# Patient Record
Sex: FEMALE
[De-identification: names, ages, dates, MRNs, and addresses within clinical notes are randomized per-mention and may not be internally consistent; named-entity substitution may affect disease eponyms.]

---

## 2019-08-15 ENCOUNTER — HOSPITAL ENCOUNTER (EMERGENCY)
Dept: HOSPITAL 88 - ER | Age: 29
Discharge: HOME | End: 2019-08-15
Payer: SELF-PAY

## 2019-08-15 VITALS — HEIGHT: 64 IN | WEIGHT: 197 LBS | BODY MASS INDEX: 33.63 KG/M2

## 2019-08-15 DIAGNOSIS — J20.9: ICD-10-CM

## 2019-08-15 DIAGNOSIS — R50.9: Primary | ICD-10-CM

## 2019-08-15 DIAGNOSIS — R05: ICD-10-CM

## 2019-08-15 PROCEDURE — 99283 EMERGENCY DEPT VISIT LOW MDM: CPT

## 2019-08-15 PROCEDURE — 71046 X-RAY EXAM CHEST 2 VIEWS: CPT

## 2019-08-15 NOTE — DIAGNOSTIC IMAGING REPORT
Frontal and lateral views of the chest.



HISTORY:  Fever, cough

COMPARISON:  None available.

     

DISCUSSION:

   

Lungs:  

The lungs are well inflated.   

No evidence of a consolidative pneumonia or pulmonary alveolar edema.



Pleura:  

No pleural effusion or pneumothorax.



Heart and mediastinum:  

The cardiomediastinal silhouette appear(s) unremarkable.



Bones and soft tissues:  

Appear unremarkable.





IMPRESSION: 

No acute radiographic abnormality.



Signed by: Dr. Koffi Mayen D.O., M.M.M. on 8/15/2019 6:47 AM

## 2019-08-15 NOTE — XMS REPORT
Patient Summary Document

                             Created on: 08/15/2019



JENNA ARELLANO

External Reference #: [id number]

: 1990

Sex: Female



Demographics







                          Address                   [address] [place]  [address]

 

                          Home Phone                [phone]

 

                          Preferred Language        Unknown

 

                          Marital Status            Unknown

 

                          Protestant Affiliation     Unknown

 

                          Race                      Unknown

 

                                        Additional Race(s)  

 

                          Ethnic Group              Unknown





Author







                          Author                    [facility]

 

                          Address                   Unknown

 

                          Phone                     Unavailable







Support







                Name            Relationship    Address         Phone

 

                    RICHARD SHORE        PRS                 [address] [place], [place]  [id number] [phone]

 

                    EILEEN  CHER     PRS                 [address] [place]  [address]                     [phone]







Care Team Providers







                    Care Team Member Name    Role                Phone

 

                          Unavailable               Unavailable







Payers







             Payer Name    Policy Type    Policy Number    Effective Date    Expiration Date







Problems

This patient has no known problems.



Allergies, Adverse Reactions, Alerts







          Allergy Name    Allergy Type    Status    Severity    Reaction(s)    Onset Date    Inactive 

Date                      Treating Clinician        Comments

 

        No Known Allergies    DA      Active    U               2019 00:00:00                     

 

        No Known Allergies    DA      Active    U               2019 00:00:00                     

 

        No Known Allergies    DA      Active    U               2019 00:00:00                     

 

        No Known Allergies    DA      Active    U               2018 00:00:00                     

 

        No Known Allergies    DA      Active    U               2012 00:00:00                     







Medications

This patient has no known medications.



Results







           Test Description    Test Time    Test Comments    Text Results    Atomic Results    Result

 Comments

 

                SURGICAL SPECIMENS    2019 07:43:00                    --------------------------------------------------------------------------------------------RUN

 DATE: 19                     [facility]  *LIVE*                     
PAGE 1   RUN TIME: 743                            Specimen Inquiry             
      RUN USER: INTERFACE                                                       
   ----------------------------------------------------------------
----------------------------PATIENT: JENNA ARELLANO              ACCT #: 
[id number] LOC:  [facility]      U #: [id number]                                   
   AGE/SX: 28/F         ROOM: [facility]      RE19REG DR:  Lucía Andres MD            :    [birth year]     BED:  1          DIS:                        
                       STATUS: ADM IN       TLOC:           
----------------------------
---------------------------------------------------------------- SPEC #: 
[id number]:CL:        RECD:      STATUS:  THELMA           REQ #: 
[id number]                           VERONICA:      [facility] DR: Lucía Andres MD             ENTERED:      SP TYPE: SURG SPEC      OTHR DR: 
Nick Meredith JR, MD          ORDERED:  GM LEVEL 4                              
                                          CODES: [id number] - FALLOPIAN TUBE COPIES 
TO:   Lucía Andres MD   [address] [place] [address]   
[phone]    Nick Meredith JR, MD   [address] [place] [address]   [phone] PROCEDURES: GM LEVEL 4 (Incomplete) TISSUES:           1. 
FALLOPIAN TUBE, NOS - Fallopian tube, right, segment         2. FALLOPIAN TUBE, 
NOS - Fallopian tube, left, segment         FINAL DIAGNOSIS    Fallopian tube, 
right, segment: Complete transection.       Fallopian tube, left, segment: 
Complete transection; small benign paratubal    cysts.      GROSS AND 
MICROSCOPIC    GROSS DESCRIPTION: Received in formalin and labeled "right 
fallopian tube"    is a 9.9 x 0.9 cm tan, cylindrical segment. Representative 
cross-sections     are submitted in (A).                                        
                                                                                
             Received in formalin and labeled "left fallopian tube" is a 7.1 x 
0.9 cm      tan cylindrical segment. Representative cross-sections are submitted
in       (B).                                                                   
                                                                                
   MICROSCOPIC EXAMINATION:  Each fallopian tube segment has a completely       
transected lumen and is free of significant inflammation. The second          
specimen also shows small benign paratubal cysts.                               
                            ** CONTINUED ON NEXT PAGE ** 
------------------------------------------------------------------------------------------
--RUN DATE: 19                     [facility]  *LIVE*                
    PAGE 2   RUN TIME: 743                            Specimen Inquiry         
          RUN USER: INTERFACE                                                   
       
--------------------------------------------------------------------------------------------SPEC
#: 19:CL:       PATIENT: JENNA ARELLANO               #[id number]  
(Continued)--------------------------------------------------------------------------------------------
        POST-OP DIAGNOSIS    Desires sterilization, 39.3 week intrauterine preg
tracy, previous          PRE-OP DIAGNOSIS    Desires sterilization, 
39.3 week intrauterine pregnancy, previous 
-------------------------------------------------------------------------
------------------- Signed SIGNATURE ON FILE                        Adilson Walker MD 19 0743      
--------------------------------------------------------------------------------------------
                                                                        ** END 
OF REPORT **                             

 

                CBC W/AUTO DIFF    2019 07:28:00                      

 

   

 

                WHITE BLOOD CELL (test code=WBC)    10.44 x10 3/uL    4.5-11.0         

 

                RED BLOOD CELL (test code=RBC)    3.25 x10 6/uL    3.54-5.02        

 

                HEMOGLOBIN (test code=HGB)    8.7 g/dL        11.0-15.0        

 

                HEMATOCRIT (test code=HCT)    28.2 %          33.0-45.0        

 

                MEAN CELL VOLUME (test code=MCV)    86.8 fL         81.0-99.0        

 

                MEAN CELL HGB (test code=MCH)    26.8 pg         27.0-33.0        

 

                MEAN CELL HGB CONCETRATION (test code=MCHC)    30.9 g/dL       33.0-37.0        

 

                RED CELL DISTRIBUTION WIDTH CV (test code=RDW)    13.2 %          11.5-14.5        

 

                RED CELL DISTRIBUTION WIDTH SD (test code=RDW-SD)    41.5 fL         37.0-54.0        

 

                PLATELET COUNT (test code=PLT)    195 x10 3/uL    150-400          

 

                MEAN PLATELET VOLUME (test code=MPV)    11.2 fL         7.0-9.0          

 

                NEUTROPHIL % (test code=NT%)    74.0 %          56.0-77.0        

 

                IMMATURE GRANULOCYTE % (test code=IG%)    0.5 %           0.0-2.0          

 

                LYMPHOCYTE % (test code=LY%)    18.2 %          14.0-32.0        

 

                MONOCYTE % (test code=MO%)    6.6 %           4.8-9.0          

 

                EOSINOPHIL % (test code=EO%)    0.5 %           0.3-3.7          

 

                BASOPHIL % (test code=BA%)    0.2 %           0.0-2.0          

 

                NUCLEATED RBC % (test code=NRBC%)    0.0 %           0-0              

 

                NEUTROPHIL # (test code=NT#)    7.73 x10 3/uL    2.0-7.6          

 

                IMMATURE GRANULOCYTE # (test code=IG#)    0.05 x10 3/uL    0.00-0.03        

 

                LYMPHOCYTE # (test code=LY#)    1.90 x10 3/uL    1.0-3.8          

 

                MONOCYTE # (test code=MO#)    0.69 x10 3/uL    0.1-0.8          

 

                EOSINOPHIL # (test code=EO#)    0.05 x10 3/uL    0.0-0.2          

 

                BASOPHIL # (test code=BA#)    0.02 x10 3/uL    0.0-0.2          

 

                NUCLEATED RBC # (test code=NRBC#)    0.00 x10 3/uL    0.0-0.1          

 

                MANUAL DIFF REQUIRED (test code=MDIFF)    NO                               





RAPID PLASMA [id number] 11:58:00* 





                Test Item       Value           Reference Range    Comments

 

                RAPID PLASMA REAGIN (test code=RPR)    NONREACTIVE     NONREACTIVE      





AG HEPATITIS B [id number] 11:58:00* 





                Test Item       Value           Reference Range    Comments

 

                AG HEPATITIS B SURFACE (test code=HBSAG)    NON REACTIVE INDEX    NonReactive      





AB HIV 1    11:58:00* 





                Test Item       Value           Reference Range    Comments

 

                AB HIV 1   2 (test code=[id number])    NONREACTIVE INDEX    NONREACTIVE      





RAPID PLASMA [id number] 10:44:00* 





                Test Item       Value           Reference Range    Comments

 

                RAPID PLASMA REAGIN (test code=RPR)    NONREACTIVE     NONREACTIVE      





AG HEPATITIS B [id number] 10:44:00* 





                Test Item       Value           Reference Range    Comments

 

                AG HEPATITIS B SURFACE (test code=HBSAG)    NON REACTIVE INDEX    NonReactive      





AB HIV 1    10:44:00* 





                Test Item       Value           Reference Range    Comments

 

                AB HIV 1   2 (test code=[id number])     INDEX          NONREACTIVE      





RAPID PLASMA [id number] 10:41:00* 





                Test Item       Value           Reference Range    Comments

 

                RAPID PLASMA REAGIN (test code=RPR)                    NONREACTIVE      





AG HEPATITIS B [id number] 10:41:00* 





                Test Item       Value           Reference Range    Comments

 

                AG HEPATITIS B SURFACE (test code=HBSAG)    NON REACTIVE INDEX    NonReactive      





AB HIV 1    10:41:00* 





                Test Item       Value           Reference Range    Comments

 

                AB HIV 1   2 (test code=[id number])     INDEX          NONREACTIVE      





CBC W/AUTO [id number] 09:50:00* 





                Test Item       Value           Reference Range    Comments

 

                WHITE BLOOD CELL (test code=WBC)    9.20 x10 3/uL    4.5-11.0         

 

                RED BLOOD CELL (test code=RBC)    4.05 x10 6/uL    3.54-5.02        

 

                HEMOGLOBIN (test code=HGB)    10.9 g/dL       11.0-15.0        

 

                HEMATOCRIT (test code=HCT)    34.9 %          33.0-45.0        

 

                MEAN CELL VOLUME (test code=MCV)    86.2 fL         81.0-99.0        

 

                MEAN CELL HGB (test code=MCH)    26.9 pg         27.0-33.0        

 

                MEAN CELL HGB CONCETRATION (test code=MCHC)    31.2 g/dL       33.0-37.0        

 

                RED CELL DISTRIBUTION WIDTH CV (test code=RDW)    12.8 %          11.5-14.5        

 

                RED CELL DISTRIBUTION WIDTH SD (test code=RDW-SD)    40.1 fL         37.0-54.0        

 

                PLATELET COUNT (test code=PLT)    258 x10 3/uL    150-400          

 

                MEAN PLATELET VOLUME (test code=MPV)    10.6 fL         7.0-9.0          

 

                NEUTROPHIL % (test code=NT%)    68.2 %          56.0-77.0        

 

                IMMATURE GRANULOCYTE % (test code=IG%)    0.8 %           0.0-2.0          

 

                LYMPHOCYTE % (test code=LY%)    23.5 %          14.0-32.0        

 

                MONOCYTE % (test code=MO%)    6.1 %           4.8-9.0          

 

                EOSINOPHIL % (test code=EO%)    1.2 %           0.3-3.7          

 

                BASOPHIL % (test code=BA%)    0.2 %           0.0-2.0          

 

                NUCLEATED RBC % (test code=NRBC%)    0.0 %           0-0              

 

                NEUTROPHIL # (test code=NT#)    6.28 x10 3/uL    2.0-7.6          

 

                IMMATURE GRANULOCYTE # (test code=IG#)    0.07 x10 3/uL    0.00-0.03        

 

                LYMPHOCYTE # (test code=LY#)    2.16 x10 3/uL    1.0-3.8          

 

                MONOCYTE # (test code=MO#)    0.56 x10 3/uL    0.1-0.8          

 

                EOSINOPHIL # (test code=EO#)    0.11 x10 3/uL    0.0-0.2          

 

                BASOPHIL # (test code=BA#)    0.02 x10 3/uL    0.0-0.2          

 

                NUCLEATED RBC # (test code=NRBC#)    0.00 x10 3/uL    0.0-0.1          

 

                MANUAL DIFF REQUIRED (test code=MDIFF)    NO